# Patient Record
Sex: FEMALE | Race: WHITE | Employment: FULL TIME | URBAN - METROPOLITAN AREA
[De-identification: names, ages, dates, MRNs, and addresses within clinical notes are randomized per-mention and may not be internally consistent; named-entity substitution may affect disease eponyms.]

---

## 2017-06-04 ENCOUNTER — HOSPITAL ENCOUNTER (EMERGENCY)
Age: 42
Discharge: HOME | End: 2017-06-04
Attending: EMERGENCY MEDICINE
Payer: COMMERCIAL

## 2017-06-04 VITALS
OXYGEN SATURATION: 100 % | BODY MASS INDEX: 21.34 KG/M2 | HEART RATE: 105 BPM | WEIGHT: 125 LBS | RESPIRATION RATE: 20 BRPM | SYSTOLIC BLOOD PRESSURE: 114 MMHG | HEIGHT: 64 IN | DIASTOLIC BLOOD PRESSURE: 65 MMHG | TEMPERATURE: 98 F

## 2017-06-04 DIAGNOSIS — R53.81 MALAISE AND FATIGUE: Primary | ICD-10-CM

## 2017-06-04 DIAGNOSIS — R53.83 MALAISE AND FATIGUE: Primary | ICD-10-CM

## 2017-06-04 DIAGNOSIS — N39.0 UTI (URINARY TRACT INFECTION), UNCOMPLICATED: ICD-10-CM

## 2017-06-04 LAB
ALBUMIN SERPL BCP-MCNC: 4 G/DL (ref 3.4–5)
ALBUMIN/GLOB SERPL: 0.9 {RATIO} (ref 0.8–1.7)
ALP SERPL-CCNC: 38 U/L (ref 45–117)
ALT SERPL-CCNC: 19 U/L (ref 13–56)
ANION GAP BLD CALC-SCNC: 7 MMOL/L (ref 3–18)
APPEARANCE UR: ABNORMAL
AST SERPL W P-5'-P-CCNC: 17 U/L (ref 15–37)
BACTERIA URNS QL MICRO: ABNORMAL /HPF
BASOPHILS # BLD AUTO: 0 K/UL (ref 0–0.06)
BASOPHILS # BLD: 0 % (ref 0–2)
BILIRUB SERPL-MCNC: 0.5 MG/DL (ref 0.2–1)
BILIRUB UR QL: NEGATIVE
BUN SERPL-MCNC: 10 MG/DL (ref 7–18)
BUN/CREAT SERPL: 14 (ref 12–20)
CALCIUM SERPL-MCNC: 9.5 MG/DL (ref 8.5–10.1)
CHLORIDE SERPL-SCNC: 102 MMOL/L (ref 100–108)
CO2 SERPL-SCNC: 31 MMOL/L (ref 21–32)
COLOR UR: YELLOW
CREAT SERPL-MCNC: 0.72 MG/DL (ref 0.6–1.3)
DIFFERENTIAL METHOD BLD: NORMAL
EOSINOPHIL # BLD: 0 K/UL (ref 0–0.4)
EOSINOPHIL NFR BLD: 1 % (ref 0–5)
EPITH CASTS URNS QL MICRO: ABNORMAL /LPF (ref 0–5)
ERYTHROCYTE [DISTWIDTH] IN BLOOD BY AUTOMATED COUNT: 12.5 % (ref 11.6–14.5)
GLOBULIN SER CALC-MCNC: 4.5 G/DL (ref 2–4)
GLUCOSE SERPL-MCNC: 135 MG/DL (ref 74–99)
GLUCOSE UR STRIP.AUTO-MCNC: NEGATIVE MG/DL
HCG UR QL: NEGATIVE
HCT VFR BLD AUTO: 40.9 % (ref 35–45)
HGB BLD-MCNC: 14.2 G/DL (ref 12–16)
HGB UR QL STRIP: ABNORMAL
KETONES UR QL STRIP.AUTO: NEGATIVE MG/DL
LEUKOCYTE ESTERASE UR QL STRIP.AUTO: ABNORMAL
LYMPHOCYTES # BLD AUTO: 23 % (ref 21–52)
LYMPHOCYTES # BLD: 1.5 K/UL (ref 0.9–3.6)
MCH RBC QN AUTO: 30.6 PG (ref 24–34)
MCHC RBC AUTO-ENTMCNC: 34.7 G/DL (ref 31–37)
MCV RBC AUTO: 88.1 FL (ref 74–97)
MONOCYTES # BLD: 0.2 K/UL (ref 0.05–1.2)
MONOCYTES NFR BLD AUTO: 4 % (ref 3–10)
NEUTS SEG # BLD: 4.6 K/UL (ref 1.8–8)
NEUTS SEG NFR BLD AUTO: 72 % (ref 40–73)
NITRITE UR QL STRIP.AUTO: NEGATIVE
PH UR STRIP: 7 [PH] (ref 5–8)
PLATELET # BLD AUTO: 308 K/UL (ref 135–420)
PMV BLD AUTO: 9.9 FL (ref 9.2–11.8)
POTASSIUM SERPL-SCNC: 4 MMOL/L (ref 3.5–5.5)
PROT SERPL-MCNC: 8.5 G/DL (ref 6.4–8.2)
PROT UR STRIP-MCNC: NEGATIVE MG/DL
RBC # BLD AUTO: 4.64 M/UL (ref 4.2–5.3)
RBC #/AREA URNS HPF: NEGATIVE /HPF (ref 0–5)
SODIUM SERPL-SCNC: 140 MMOL/L (ref 136–145)
SP GR UR REFRACTOMETRY: 1.01 (ref 1–1.03)
UROBILINOGEN UR QL STRIP.AUTO: 0.2 EU/DL (ref 0.2–1)
WBC # BLD AUTO: 6.4 K/UL (ref 4.6–13.2)
WBC URNS QL MICRO: ABNORMAL /HPF (ref 0–5)

## 2017-06-04 PROCEDURE — 80053 COMPREHEN METABOLIC PANEL: CPT | Performed by: PHYSICIAN ASSISTANT

## 2017-06-04 PROCEDURE — 81001 URINALYSIS AUTO W/SCOPE: CPT | Performed by: PHYSICIAN ASSISTANT

## 2017-06-04 PROCEDURE — 81025 URINE PREGNANCY TEST: CPT

## 2017-06-04 PROCEDURE — 85025 COMPLETE CBC W/AUTO DIFF WBC: CPT | Performed by: PHYSICIAN ASSISTANT

## 2017-06-04 PROCEDURE — 99282 EMERGENCY DEPT VISIT SF MDM: CPT

## 2017-06-04 RX ORDER — CEPHALEXIN 500 MG/1
500 CAPSULE ORAL 2 TIMES DAILY
Qty: 14 CAP | Refills: 0 | Status: SHIPPED | OUTPATIENT
Start: 2017-06-04 | End: 2017-06-11

## 2017-06-04 RX ORDER — NORETHINDRONE ACETATE AND ETHINYL ESTRADIOL 1MG-20(21)
KIT ORAL
COMMUNITY

## 2017-06-04 RX ORDER — ONDANSETRON 4 MG/1
4 TABLET, ORALLY DISINTEGRATING ORAL
Qty: 20 TAB | Refills: 0 | Status: SHIPPED | OUTPATIENT
Start: 2017-06-04

## 2017-06-04 RX ORDER — BUSPIRONE HYDROCHLORIDE 5 MG/1
TABLET ORAL
COMMUNITY

## 2017-06-04 RX ORDER — HYOSCYAMINE SULFATE 0.38 MG/1
375 TABLET, EXTENDED RELEASE ORAL
COMMUNITY

## 2017-06-04 NOTE — DISCHARGE INSTRUCTIONS
Fatigue: Care Instructions  Your Care Instructions  Fatigue is a feeling of tiredness, exhaustion, or lack of energy. You may feel fatigue because of too much or not enough activity. It can also come from stress, lack of sleep, boredom, and poor diet. Many medical problems, such as viral infections, can cause fatigue. Emotional problems, especially depression, are often the cause of fatigue. Fatigue is most often a symptom of another problem. Treatment for fatigue depends on the cause. For example, if you have fatigue because you have a certain health problem, treating this problem also treats your fatigue. If depression or anxiety is the cause, treatment may help. Follow-up care is a key part of your treatment and safety. Be sure to make and go to all appointments, and call your doctor if you are having problems. It's also a good idea to know your test results and keep a list of the medicines you take. How can you care for yourself at home? · Get regular exercise. But don't overdo it. Go back and forth between rest and exercise. · Get plenty of rest.  · Eat a healthy diet. Do not skip meals, especially breakfast.  · Reduce your use of caffeine, tobacco, and alcohol. Caffeine is most often found in coffee, tea, cola drinks, and chocolate. · Limit medicines that can cause fatigue. This includes tranquilizers and cold and allergy medicines. When should you call for help? Watch closely for changes in your health, and be sure to contact your doctor if:  · You have new symptoms such as fever or a rash. · Your fatigue gets worse. · You have been feeling down, depressed, or hopeless. Or you may have lost interest in things that you usually enjoy. · You are not getting better as expected. Where can you learn more? Go to http://anabell-polly.info/. Enter G795 in the search box to learn more about \"Fatigue: Care Instructions. \"  Current as of: May 27, 2016  Content Version: 11.2  © 3423-8338 Dr. [employer]iff. Care instructions adapted under license by [employer] (which disclaims liability or warranty for this information). If you have questions about a medical condition or this instruction, always ask your healthcare professional. [place] [address] any warranty or liability for your use of this information. Urinary Tract Infection in Women: Care Instructions  Your Care Instructions    A urinary tract infection, or UTI, is a general term for an infection anywhere between the kidneys and the urethra (where urine comes out). Most UTIs are bladder infections. They often cause pain or burning when you urinate. UTIs are caused by bacteria and can be cured with antibiotics. Be sure to complete your treatment so that the infection goes away. Follow-up care is a key part of your treatment and safety. Be sure to make and go to all appointments, and call your doctor if you are having problems. It's also a good idea to know your test results and keep a list of the medicines you take. How can you care for yourself at home? · Take your antibiotics as directed. Do not stop taking them just because you feel better. You need to take the full course of antibiotics. · Drink extra water and other fluids for the next day or two. This may help wash out the bacteria that are causing the infection. (If you have kidney, heart, or liver disease and have to limit fluids, talk with your doctor before you increase your fluid intake.)  · Avoid drinks that are carbonated or have caffeine. They can irritate the bladder. · Urinate often. Try to empty your bladder each time. · To relieve pain, take a hot bath or lay a heating pad set on low over your lower belly or genital area. Never go to sleep with a heating pad in place. To prevent UTIs  · Drink plenty of water each day. This helps you urinate often, which clears bacteria from your system.  (If you have kidney, heart, or liver disease and have to limit fluids, talk with your doctor before you increase your fluid intake.)  · Urinate when you need to. · Urinate right after you have sex. · Change sanitary pads often. · Avoid douches, bubble baths, feminine hygiene sprays, and other feminine hygiene products that have deodorants. · After going to the bathroom, wipe from front to back. When should you call for help? Call your doctor now or seek immediate medical care if:  · Symptoms such as fever, chills, nausea, or vomiting get worse or appear for the first time. · You have new pain in your back just below your rib cage. This is called flank pain. · There is new blood or pus in your urine. · You have any problems with your antibiotic medicine. Watch closely for changes in your health, and be sure to contact your doctor if:  · You are not getting better after taking an antibiotic for 2 days. · Your symptoms go away but then come back. Where can you learn more? Go to http://anabell-polly.info/. Enter P883 in the search box to learn more about \"Urinary Tract Infection in Women: Care Instructions. \"  Current as of: November 28, 2016  Content Version: 11.2  © 3995-0631 [employer]. Care instructions adapted under license by [employer] (which disclaims liability or warranty for this information). If you have questions about a medical condition or this instruction, always ask your healthcare professional. [place] [address] any warranty or liability for your use of this information.

## 2017-06-04 NOTE — ED PROVIDER NOTES
[address]  EMERGENCY DEPARTMENT HISTORY AND PHYSICAL EXAM       Date: 2017   Patient Name: Steve Lugo   YOB: 1975  Medical Record Number: [id number]    History of Presenting Illness     Chief Complaint   Patient presents with    Other        History Provided By:  patient    Additional History: 1:21 PM   Steve Lugo is a 46 y.o. female with anxiety who presents to the emergency department C/O tingling in all extremities for the last 2 months and gradually worsening the last 3 days. Pt mentions 2 months ago the tingling started in her right foot, she was given a cortisone shot then. Then she started having tingling in her bilateral 1st and 3rd fingers one month ago. Sx's got worse over the last 3 days and pt [facility] but nothing was found then. Pt reports in ED both her arms feel heavy, upper back tenderness, generalized fatigue and muscle spasms all over. Pt is on a anti-anxiety medication which she started a few weeks ago. Pt is on Buspirone. Pt reports similar sx's 2 years ago which she was seen by Katherine Echevarria MD for and had an EMG and brain MRI; both test were normal. Pt is scheduled to see Katherine Echevarria MD in 2 weeks. PSHx include right thoracotomy. PMHx includes IBS. Pt denies cough, congestion or sore throat.     Primary Care Provider: Dalton Grubbs MD   Specialist:    Past History     Past Medical History:   Past Medical History:   Diagnosis Date    Adverse effect of anesthesia     low blood pressure    Anxiety     Cancer ([address]     skin    GERD (gastroesophageal reflux disease)     IBS (irritable bowel syndrome)     IBS (irritable bowel syndrome)     Psychiatric disorder     PVC (premature ventricular contraction)         Past Surgical History:   Past Surgical History:   Procedure Laterality Date    HX  SECTION      HX CYST REMOVAL      muscle of esophagus    HX GI      \"on my esophagus, I had a tumor, it was not cancer\"    HX GYN      HX ORTHOPAEDIC      \"right foot surgery\"  great toe    HX THORACOTOMY Right         Family History:   History reviewed. No pertinent family history. Social History:   Social History   Substance Use Topics    Smoking status: Never Smoker    Smokeless tobacco: Never Used    Alcohol use Yes      Comment: 2 x month        Allergies: Allergies   Allergen Reactions    Flagyl [Metronidazole] Hives    Morphine Itching        Review of Systems   Review of Systems   Constitutional: Positive for fatigue. HENT: Negative for congestion and sore throat. Respiratory: Negative for cough. Musculoskeletal: Positive for back pain (tenderness). Neurological:        (+) tingling   All other systems reviewed and are negative. Physical Exam  Vitals:    06/04/17 1319   BP: 114/65   Pulse: (!) 105   Resp: 20   Temp: 98 °F (36.7 °C)   SpO2: 100%   Weight: 56.7 kg (125 lb)   Height: 5' 4\" (1.626 m)       Physical Exam   Constitutional: She is oriented to person, place, and time. She appears well-developed and well-nourished. No distress. HENT:   Head: Normocephalic and atraumatic. Eyes: Conjunctivae and EOM are normal. Pupils are equal, round, and reactive to light. Neck: Normal range of motion. Neck supple. Cardiovascular: Normal rate and regular rhythm. Pulmonary/Chest: Effort normal and breath sounds normal.   Abdominal: Soft. Bowel sounds are normal. There is no tenderness. Musculoskeletal: Normal range of motion. She exhibits no edema, tenderness or deformity. Neurological: She is alert and oriented to person, place, and time. She has normal strength. No cranial nerve deficit. Coordination and gait normal. GCS eye subscore is 4. GCS verbal subscore is 5. GCS motor subscore is 6. Skin: Skin is warm and dry. No rash noted. No erythema. Psychiatric: She has a normal mood and affect. Her behavior is normal.   Nursing note and vitals reviewed.         Diagnostic Study Results     Labs -      Recent Results (from the past 12 hour(s))   CBC WITH AUTOMATED DIFF    Collection Time: 06/04/17  1:50 PM   Result Value Ref Range    WBC 6.4 4.6 - 13.2 K/uL    RBC 4.64 4.20 - 5.30 M/uL    HGB 14.2 12.0 - 16.0 g/dL    HCT 40.9 35.0 - 45.0 %    MCV 88.1 74.0 - 97.0 FL    MCH 30.6 24.0 - 34.0 PG    MCHC 34.7 31.0 - 37.0 g/dL    RDW 12.5 11.6 - 14.5 %    PLATELET 233 983 - 664 K/uL    MPV 9.9 9.2 - 11.8 FL    NEUTROPHILS 72 40 - 73 %    LYMPHOCYTES 23 21 - 52 %    MONOCYTES 4 3 - 10 %    EOSINOPHILS 1 0 - 5 %    BASOPHILS 0 0 - 2 %    ABS. NEUTROPHILS 4.6 1.8 - 8.0 K/UL    ABS. LYMPHOCYTES 1.5 0.9 - 3.6 K/UL    ABS. MONOCYTES 0.2 0.05 - 1.2 K/UL    ABS. EOSINOPHILS 0.0 0.0 - 0.4 K/UL    ABS. BASOPHILS 0.0 0.0 - 0.06 K/UL    DF AUTOMATED     METABOLIC PANEL, COMPREHENSIVE    Collection Time: 06/04/17  1:50 PM   Result Value Ref Range    Sodium 140 136 - 145 mmol/L    Potassium 4.0 3.5 - 5.5 mmol/L    Chloride 102 100 - 108 mmol/L    CO2 31 21 - 32 mmol/L    Anion gap 7 3.0 - 18 mmol/L    Glucose 135 (H) 74 - 99 mg/dL    BUN 10 7.0 - 18 MG/DL    Creatinine 0.72 0.6 - 1.3 MG/DL    BUN/Creatinine ratio 14 12 - 20      GFR est AA >60 >60 ml/min/1.73m2    GFR est non-AA >60 >60 ml/min/1.73m2    Calcium 9.5 8.5 - 10.1 MG/DL    Bilirubin, total 0.5 0.2 - 1.0 MG/DL    ALT (SGPT) 19 13 - 56 U/L    AST (SGOT) 17 15 - 37 U/L    Alk.  phosphatase 38 (L) 45 - 117 U/L    Protein, total 8.5 (H) 6.4 - 8.2 g/dL    Albumin 4.0 3.4 - 5.0 g/dL    Globulin 4.5 (H) 2.0 - 4.0 g/dL    A-G Ratio 0.9 0.8 - 1.7     URINALYSIS W/ RFLX MICROSCOPIC    Collection Time: 06/04/17  1:50 PM   Result Value Ref Range    Color YELLOW      Appearance CLOUDY      Specific gravity 1.009 1.005 - 1.030      pH (UA) 7.0 5.0 - 8.0      Protein NEGATIVE  NEG mg/dL    Glucose NEGATIVE  NEG mg/dL    Ketone NEGATIVE  NEG mg/dL    Bilirubin NEGATIVE  NEG      Blood TRACE (A) NEG      Urobilinogen 0.2 0.2 - 1.0 EU/dL    Nitrites NEGATIVE  NEG Leukocyte Esterase LARGE (A) NEG     URINE MICROSCOPIC ONLY    Collection Time: 06/04/17  1:50 PM   Result Value Ref Range    WBC 20 to 30 0 - 5 /hpf    RBC NEGATIVE  0 - 5 /hpf    Epithelial cells 3+ 0 - 5 /lpf    Bacteria 1+ (A) NEG /hpf   HCG URINE, QL. - POC    Collection Time: 06/04/17  2:20 PM   Result Value Ref Range    Pregnancy test,urine (POC) NEGATIVE  NEG         Radiologic Studies -  The following have been ordered and reviewed:  No orders to display           Medical Decision Making   I am the first provider for this patient. I reviewed the vital signs, available nursing notes, past medical history, past surgical history, family history and social history. Vital Signs-Reviewed the patient's vital signs. Patient Vitals for the past 12 hrs:   Temp Pulse Resp BP SpO2   06/04/17 1319 98 °F (36.7 °C) (!) 105 20 114/65 100 %     Procedures:   Procedures    ED Course:  1:21 PM  Initial assessment performed. The patients presenting problems have been discussed, and they are in agreement with the care plan formulated and outlined with them. I have encouraged them to ask questions as they arise throughout their visit. Medications Given in the ED:  Medications - No data to display    Discharge Note:  3:19 PM  Pt has been reexamined. Patient has no new complaints, changes, or physical findings. Care plan outlined and precautions discussed. Results were reviewed with the patient. All medications were reviewed with the patient; will d/c home with Keflex and Zofran. All of pt's questions and concerns were addressed. Patient was instructed and agrees to follow up with Neuro and PCP, as well as to return to the ED upon further deterioration. Patient is ready to go home. Diagnosis   Clinical Impression:   1.  Malaise and fatigue    2. UTI (urinary tract infection), uncomplicated         Discussion:    Follow-up Information     Follow up With Details Comments Contact Info    Carson Carcamo MD Schedule an appointment as soon as possible for a visit in 2 days  [address] [facility] [address]      Karlene Dixon,  Schedule an appointment as soon as possible for a visit in 2 days  [address]      [facility] EMERGENCY DEPT  As needed, If symptoms worsen [address] Carla Lennox  [phone]          Current Discharge Medication List      START taking these medications    Details   cephALEXin (KEFLEX) 500 mg capsule Take 1 Cap by mouth two (2) times a day for 7 days. Qty: 14 Cap, Refills: 0      ondansetron (ZOFRAN ODT) 4 mg disintegrating tablet Take 1 Tab by mouth every eight (8) hours as needed for Nausea. Qty: 20 Tab, Refills: 0             _______________________________   Attestations: This note is prepared by Marcela Estevez , acting as a Scribe for Neel Grajeda PA-C  on 1:18 PM on 6/4/2017 . Neel Grajeda PA-C: The scribe's documentation has been prepared under my direction and personally reviewed by me in its entirety.   _______________________________

## 2017-06-04 NOTE — ED TRIAGE NOTES
C/o tingling to RLE x2 months ago, tingling to fingers x1 month ago, generalized weakness, upper back pain. Pt was ambulatory to room. States has been seen by Dr. Jeferson Degroot 2 years ago for intermittent episodes of tingling to extremities, difficulty with tongue numbness at that time in 2015. Has an appt set up with Dr. Jeferson Degroot in a couple weeks, feels like symptoms are getting worse since Thursday. Seen Thursday at urgent care [facility] completed blood work there, instructed to f/u with neurologist.     Sepsis Screening completed    (  )Patient meets SIRS criteria. (x  )Patient does not meet SIRS criteria.       SIRS Criteria is achieved when two or more of the following are present   Temperature < 96.8°F (36°C) or > 100.9°F (38.3°C)   Heart Rate > 90 beats per minute   Respiratory Rate > 20 breaths per minute   WBC count > 12,000 or <4,000 or > 10% bands

## 2017-07-12 ENCOUNTER — HOSPITAL ENCOUNTER (OUTPATIENT)
Dept: MRI IMAGING | Age: 42
Discharge: HOME | End: 2017-07-12
Attending: PSYCHIATRY & NEUROLOGY
Payer: COMMERCIAL

## 2017-07-12 DIAGNOSIS — R94.131 ABNORMAL EMG: ICD-10-CM

## 2017-07-12 PROCEDURE — 72148 MRI LUMBAR SPINE W/O DYE: CPT

## 2017-07-12 PROCEDURE — 72195 MRI PELVIS W/O DYE: CPT

## 2017-07-12 PROCEDURE — 72141 MRI NECK SPINE W/O DYE: CPT

## 2018-12-21 ENCOUNTER — HOSPITAL ENCOUNTER (OUTPATIENT)
Dept: SLEEP MEDICINE | Age: 43
Discharge: HOME | End: 2018-12-21
Payer: COMMERCIAL

## 2018-12-21 DIAGNOSIS — E04.9 NONTOXIC NODULAR GOITER: ICD-10-CM

## 2018-12-21 DIAGNOSIS — G47.33 OSA (OBSTRUCTIVE SLEEP APNEA): ICD-10-CM

## 2018-12-21 DIAGNOSIS — Z98.891 HISTORY OF CESAREAN SECTION: ICD-10-CM

## 2018-12-21 DIAGNOSIS — K08.409: ICD-10-CM

## 2018-12-21 DIAGNOSIS — F32.A DEPRESSION: ICD-10-CM

## 2018-12-21 DIAGNOSIS — F41.9 ANXIETY: ICD-10-CM

## 2018-12-21 DIAGNOSIS — M70.60 TROCHANTERIC BURSITIS: ICD-10-CM

## 2018-12-21 DIAGNOSIS — I49.3 PVC (PREMATURE VENTRICULAR CONTRACTION): ICD-10-CM

## 2018-12-21 DIAGNOSIS — K58.9 IBS (IRRITABLE BOWEL SYNDROME): ICD-10-CM

## 2018-12-21 PROCEDURE — 95810 POLYSOM 6/> YRS 4/> PARAM: CPT

## 2018-12-22 VITALS
HEART RATE: 69 BPM | WEIGHT: 120 LBS | DIASTOLIC BLOOD PRESSURE: 57 MMHG | SYSTOLIC BLOOD PRESSURE: 97 MMHG | HEIGHT: 63 IN | BODY MASS INDEX: 21.26 KG/M2

## 2020-02-07 ENCOUNTER — HOSPITAL ENCOUNTER (OUTPATIENT)
Dept: PHYSICAL THERAPY | Age: 45
Discharge: HOME | End: 2020-02-07
Payer: COMMERCIAL

## 2020-02-07 PROCEDURE — 97161 PT EVAL LOW COMPLEX 20 MIN: CPT

## 2020-02-07 PROCEDURE — 97110 THERAPEUTIC EXERCISES: CPT

## 2020-02-07 NOTE — PROGRESS NOTES
PT DAILY TREATMENT NOTE/HIP [id number]    Patient Name: Ruth Ann Rudd  Date:2020  : 1975  [x]  Patient  Verified  Payor: BLUE CROSS / Plan: [address] [place] [address] / Product Type: PPO /    In time:805  Out time:900  Total Treatment Time (min): 25  Visit #: 1 of 16    Medicare/BCBS Only   Total Timed Codes (min):  25 1:1 Treatment Time:  25     Treatment Area: Right hip pain [M25.551]    SUBJECTIVE  Pain Level (0-10 scale): 3  []constant []intermittent []improving []worsening [x]no change since onset    Any medication changes, allergies to medications, adverse drug reactions, diagnosis change, or new procedure performed?: [x] No    [] Yes (see summary sheet for update)  Subjective functional status/changes:     Patient has c/c of right hip pain since 2019 with no apparent reason for onset. Patient describes pain as tightness when stretching which intermittently becomes sharp. Denies numbness/tingling. Denies popping/clicking. Aggravating factors: trunk flexion with knee extension. Alleviating factors: heat/cold application. Denies red flags: SOB, chest pain, dizziness/lightheadedness, blurred/double vision, HA, chills/fevers, night sweats, change in bowel/bladder control, abdominal pain, difficulty swallowing, slurred speech, unexplained weight gain/loss, nausea, vomiting. PMHx: bursitis of hips. Surgical Hx: fused right great toe 2015, thoracic surgery. 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 PLOF: active exerciser at gym 2-3x/wk, long walks daily. CLOF: limited ability to tolerate stretching exercises and yoga classed .   Diagnostic Imaging: none      OBJECTIVE/EXAMINATION    Precautions: none    25 min [x]Eval                  []Re-Eval       25 min Therapeutic Exercise:  [x] See flow sheet :   Rationale: increase ROM, increase strength and decrease pain to improve the patients ability to complete ADLs       With   [] TE   [] TA   [] neuro   [] other: Patient Education: [x] Review HEP    [] Progressed/Changed HEP based on:   [] positioning   [] body mechanics   [] transfers   [] heat/ice application    [] other:        Physical Therapy Evaluation- Hip    Posture:    Gait:  [] Normal    [] Abnormal    [] Antalgic    [] NWB    Device:    Describe:         ROM/Strength     AROM               Strength (1-5)  Hip Left Right Left Right   Flexion 142 123 4 4   Extension 18 11 4 4-   Abduction 42 37 4 4-   Adduction 38 43 4 4   ER 47 45 4 4-   IR 43 35 4 4                                     Palpation  [] Min  [x] Mod  [] Severe    Location:right ischial tuberosity  [] Min  [x] Mod  [] Severe    Location: right piriformis    Optional Tests  Den/ Loree Test: [x] Neg    [] Pos  Lauri Test:  [] Neg    [] Pos  Scouring Test: [x] Neg    [] Pos  Trendelenberg:  [x] Neg    [] Pos [] Left    [] Right  OberTest:   [] Neg    [x] Pos  Ely's Test:  [x] Neg    [] Pos  Piriformis Test:  [] Neg    [x] Pos    Other tests/ comments:       Pain Level (0-10 scale) post treatment: 3    ASSESSMENT/Changes in Function: Patient presents with signs/symptoms of right proximal hamstring and piriformis muscles acute strains. Patient exhibits decreased AROM, strength right hip with limited function and moderate pain. .    Patient will continue to benefit from skilled PT services to modify and progress therapeutic interventions, address functional mobility deficits, address ROM deficits, address strength deficits, analyze and address soft tissue restrictions, analyze and cue movement patterns, analyze and modify body mechanics/ergonomics and assess and modify postural abnormalities to attain remaining goals.      [x]  See Plan of Care  []  See progress note/recertification  []  See Discharge Summary         Progress towards goals / Updated goals:  See POC    PLAN  []  Upgrade activities as tolerated     [x]  Continue plan of care  []  Update interventions per flow sheet []  Discharge due to:_  []  Other:_      Gwendolyn Garza, PT 2/7/2020  8:07 AM

## 2020-02-07 NOTE — PROGRESS NOTES
[facility] at [address] [place] [address] Drive: [phone]   Fax: [phone]  PLAN OF CARE / [address] [place] [address] FOR PHYSICAL THERAPY SERVICES  Patient Name: Jessica Nuñez : 1975   Medical   Diagnosis: Right hip pain [M25.551] Treatment Diagnosis: Right hip pain   Onset Date: 2019     Referral Source: Referred, Self, MD Start of Care [facility]): 2020   Prior Hospitalization: See medical history Provider #: [id number]   Prior Level of Function: active exerciser at gym 2-3x/wk, long walks daily. Comorbidities: Bursitis of hips   Medications: Verified on Patient Summary List   The Plan of Care and following information is based on the information from the initial evaluation.   ===========================================================================================  Assessment / ojeda information:  Jessica Nuñez is a 45 y.o.  female who presents with  signs/symptoms of right proximal hamstring and piriformis muscles acute strains. Patient exhibits decreased AROM, strength right hip with limited function and moderate pain. .     Patient will continue to benefit from skilled PT services to modify and progress therapeutic interventions, address functional mobility deficits, address ROM deficits, address strength deficits, analyze and address soft tissue restrictions, analyze and cue movement patterns, analyze and modify body mechanics/ergonomics and assess and modify postural abnormalities to attain remaining goals.       Pt instructed in HEP and will f/u in clinic for PT.  ===========================================================================================  Eval Complexity: History MEDIUM  Complexity : 1-2 comorbidities / personal factors will impact the outcome/ POC ;  Examination  LOW Complexity : 1-2 Standardized tests and measures addressing body structure, function, activity limitation and / or participation in recreation ; Presentation LOW Complexity : Stable, uncomplicated ;  Decision Making MEDIUM Complexity : FOTO score of 26-74; : FOTO score = an established functional score where 100 = no disability  Overall Complexity LOW   Problem List: pain affecting function, decrease ROM, decrease strength, decrease ADL/ functional abilitiies, decrease activity tolerance and decrease flexibility/ joint mobility   Treatment Plan may include any combination of the following: Therapeutic exercise, Therapeutic activities, Neuromuscular re-education, Physical agent/modality, Manual therapy, Patient education, Self Care training and Functional mobility training  Patient / Family readiness to learn indicated by: asking questions, trying to perform skills and interest  Persons(s) to be included in education: patient (P)  Barriers to Learning/Limitations: no  Measures Taken: NA  Patient Goal (s): \"Relieve pain and restore flexibility. \"   Patient self reported health status: good  Rehabilitation Potential: good  Goals:  Short Term Goals: To be accomplished in 4 weeks:   Patient will report compliance with HEP 1x/day to aid in rehabilitation program.   Status at IE: Patient instructed in and provided written copy of initial Home Exercise Program.   Current: Same as IE      Patient will increase hip ROM to 100% in all planes to aid in completion of ADLs   Status at IE:  Hip Left Right   Flexion 142 123   Extension 18 11   Abduction 42 37   Adduction 38 43   ER 47 45   IR 43 35    Current: Same as IE      Long Term Goals: To be accomplished in 8 weeks:   Patient will increase B LE strength to 5/5 MMT throughout to aid in completion of ADLs.    Status at IE:  Hip Left Right   Flexion 4 4   Extension 4 4-   Abduction 4 4-   Adduction 4 4   ER 4 4-   IR 4 4    Current: Same as IE     Patient will report pain no greater than 1-2/10 throughout entire day to aid in completion of ADLs   Status at IE: 3-8/10   Current: Same as IE     Patent will be able to participate in full yoga class session without increased pain. Status at IE:Unable to tolerate hip stretches in yoga class. Current: Same as IE     Patient will improve FOTO (an established functional score where 100 = no disability) score to 73 points to demonstrate improvement in functional status. Status at IE: 62   Current: Same as IE      Frequency / Duration:   Patient to be seen 2  times per week for 8  weeks:  Patient / Caregiver education and instruction: self care and exercises      . Therapist Signature: Ayanna Schuler DPT Date: 8/7/6760   Certification Period: NA Time: 9:03 AM   ===========================================================================================  I certify that the above Physical Therapy Services are being furnished while the patient is under my care. I agree with the treatment plan and certify that this therapy is necessary. Physician Signature:        Date:       Time:     Please sign and return to [facility] [place] or you may fax the signed copy to [phone]. Thank you.

## 2020-02-13 ENCOUNTER — HOSPITAL ENCOUNTER (OUTPATIENT)
Dept: PHYSICAL THERAPY | Age: 45
Discharge: HOME | End: 2020-02-13
Payer: COMMERCIAL

## 2020-02-13 PROCEDURE — 97110 THERAPEUTIC EXERCISES: CPT

## 2020-02-13 NOTE — PROGRESS NOTES
PT DAILY TREATMENT NOTE    Patient Name: Sharol Gowers  Date:2020  : 1975  [x]  Patient  Verified  Payor: BLUE CROSS / Plan: [address] [place] [address] / Product Type: PPO /    In time: 800  Out time: 854  Total Treatment Time (min): 54  Total Timed Codes (min): 49  1:1 Treatment Time (MC only): 38   Visit #: 2 of 16    Treatment Area: Right hip pain [M25.551]    SUBJECTIVE  Pain Level (0-10 scale): 4-5  Any medication changes, allergies to medications, adverse drug reactions, diagnosis change, or new procedure performed?: [x] No    [] Yes (see summary sheet for update)  Subjective functional status/changes:   [] No changes reported  \"It is a little more painful lately and interfering with my exercise routine. I stopped using the treadmil    OBJECTIVE    38 min Therapeutic Exercise:  [x] See flow sheet :   Rationale: increase ROM, increase strength and decrease pain to improve the patients ability to complete ADLs  ambulation safety and efficiency in order to improve patient's ability to safely ambulate at home for self care. With   [] TE   [] TA   [] neuro   [] other: Patient Education: [x] Review HEP    [] Progressed/Changed HEP based on:   [] positioning   [] body mechanics   [] transfers   [] heat/ice application    [] other:      Other Objective/Functional Measures: NA     Pain Level (0-10 scale) post treatment: 3    ASSESSMENT/Changes in Function: Patient reports right LE has more difficulty performing exs than left LE due to sensation of weakness on right. Added three way hip to HEP and provided written copy of new exs. Patient responds well to treatment session. No adverse effects were noted from today's treatment session.      Patient will continue to benefit from skilled PT services to modify and progress therapeutic interventions, address functional mobility deficits, address ROM deficits, address strength deficits, analyze and address soft tissue restrictions, analyze and cue movement patterns, analyze and modify body mechanics/ergonomics, assess and modify postural abnormalities,  and instruct in home and community integration to attain remaining goals. []  See Plan of Care  []  See progress note/recertification  []  See Discharge Summary         Progress towards goals / Updated goals:  Short Term Goals: To be accomplished in 4 weeks:                   Patient will report compliance with HEP 1x/day to aid in rehabilitation program.                   Status at IE: Patient instructed in and provided written copy of initial Home Exercise Program.                   Current: Reports performing HEP daily at home, but still requires verbal and visual cueing for correct technique. 2/13/2020                      Patient will increase hip ROM to 100% in all planes to aid in completion of ADLs                   Status at IE:  Hip Left Right   Flexion 142 123   Extension 18 11   Abduction 42 37   Adduction 38 43   ER 47 45   IR 43 35                    Current: Same as IE        Long Term Goals: To be accomplished in 8 weeks:                   Patient will increase B LE strength to 5/5 MMT throughout to aid in completion of ADLs. Status at IE:  Hip Left Right   Flexion 4 4   Extension 4 4-   Abduction 4 4-   Adduction 4 4   ER 4 4-   IR 4 4                    Current: Same as IE                      Patient will report pain no greater than 1-2/10 throughout entire day to aid in completion of ADLs                   Status at IE: 3-8/10                   Current: Same as IE                      Patent will be able to participate in full yoga class session without increased pain. Status at IE:Unable to tolerate hip stretches in yoga class. Current: Same as IE                      Patient will improve FOTO (an established functional score where 100 = no disability) score to 73 points to demonstrate improvement in functional status. Status at IE: 62                   Current: Same as IE         PLAN  []  Upgrade activities as tolerated     [x]  Continue plan of care  []  Update interventions per flow sheet       []  Discharge due to:_  []  Other:_      Toña Azevedo, PT, DPT 2/13/2020  8:05 AM    Future Appointments   Date Time Provider Garland Jj   2/14/2020  8:00 AM Matty Summers, PT [facility]   2/18/2020  7:30 AM Darin Gonzalez, PT [facility]   2/20/2020  7:30 AM Darin Gonzalez PT [facility]   2/25/2020  7:30 AM Darin Gonzalez PT [facility]   2/27/2020  7:30 AM Darin Gonzalez, PT [facility]   3/3/2020  7:30 AM Darin Gonzalez PT [facility]   3/5/2020  7:30 AM Darin Gonzalez PT [facility]

## 2020-02-14 ENCOUNTER — HOSPITAL ENCOUNTER (OUTPATIENT)
Dept: PHYSICAL THERAPY | Age: 45
Discharge: HOME | End: 2020-02-14
Payer: COMMERCIAL

## 2020-02-14 PROCEDURE — 97110 THERAPEUTIC EXERCISES: CPT

## 2020-02-14 NOTE — PROGRESS NOTES
PT DAILY TREATMENT NOTE    Patient Name: Rosita Bence  Date:2020  : 1975  [x]  Patient  Verified  Payor: [employer] [place] / Plan: [address] [place] [address] / Product Type: PPO /    In time: 380  Out time: 842  Total Treatment Time (min): 43  Total Timed Codes (min): 43  1:1 Treatment Time (MC only): 40   Visit #: 3 of 16    Treatment Area: Right hip pain [M25.551]    SUBJECTIVE  Pain Level (0-10 scale): 6  Any medication changes, allergies to medications, adverse drug reactions, diagnosis change, or new procedure performed?: [x] No    [] Yes (see summary sheet for update)  Subjective functional status/changes:   [] No changes reported  \"I woke up in the early morning with more pain in the hip. I am wondering if we did too much yesterday or if I just slept on it wrong. \"    OBJECTIVE    40 min Therapeutic Exercise:  [x] See flow sheet :   Rationale: increase ROM, increase strength and decrease pain to improve the patients ability to complete ADLs  ambulation safety and efficiency in order to improve patient's ability to safely ambulate at home for self care. With   [] TE   [] TA   [] neuro   [] other: Patient Education: [x] Review HEP    [] Progressed/Changed HEP based on:   [] positioning   [] body mechanics   [] transfers   [] heat/ice application    [] other:      Other Objective/Functional Measures: NA     Pain Level (0-10 scale) post treatment: 2    ASSESSMENT/Changes in Function: Patient reporting increased pain today. Reduced intensity of exercises to accommodate pain complaints. Provided written copy of modification to HEP. Patient responds well to treatment session. No adverse effects were noted from today's treatment session.      Patient will continue to benefit from skilled PT services to modify and progress therapeutic interventions, address functional mobility deficits, address ROM deficits, address strength deficits, analyze and address soft tissue restrictions, analyze and cue movement patterns, analyze and modify body mechanics/ergonomics, assess and modify postural abnormalities,  and instruct in home and community integration to attain remaining goals. []  See Plan of Care  []  See progress note/recertification  []  See Discharge Summary         Progress towards goals / Updated goals:  Short Term Goals: To be accomplished in 4 weeks:                   MKPLPYL will report compliance with HEP 1x/day to aid in rehabilitation program.                   Status at IE: Patient instructed in and provided written copy of initial Home Exercise Program.                   Current: Reports performing HEP daily at home, but still requires verbal and visual cueing for correct technique. 2/13/2020                      Patient will increase hip ROM to 100% in all planes to aid in completion of ADLs                   Status at IE:  Hip Left Right   Flexion 142 123   Extension 18 11   Abduction 42 37   Adduction 38 43   ER 47 45   IR 43 35                    Current: Same as IE        Long Term Goals: To be accomplished in 8 weeks:                   Patient will increase B LE strength to 5/5 MMT throughout to aid in completion of ADLs.                    Status at IE:  Hip Left Right   Flexion 4 4   Extension 4 4-   Abduction 4 4-   Adduction 4 4   ER 4 4-   IR 4 4                    Current: Same as IE                      PLXICJH will report pain no greater than 1-2/10 throughout entire day to aid in completion of ADLs                   Status at IE: 3-8/10                   Current: Same as IE                      Patent will be able to participate in full yoga class session without increased pain.                   Status at IE:Unable to tolerate hip stretches in yoga class.                   Current: Same as IE                      Patient will improve FOTO (an established functional score where 100 = no disability) score to 73 points to demonstrate improvement in functional status.                    Status at IE: 62                   Current: Same as IE         PLAN  []  Upgrade activities as tolerated     [x]  Continue plan of care  []  Update interventions per flow sheet       []  Discharge due to:_  []  Other:_      Ade Lucero PT, DPT 2/14/2020  8:02 AM    Future Appointments   Date Time Provider Garland Jj   2/18/2020  7:30 AM Arby Sear, PT [facility]   2/20/2020  7:30 AM Arby Sear, PT [facility]   2/25/2020  7:30 AM Arby Sear, PT [facility]   2/27/2020  7:30 AM Arby Sear, PT [facility]   3/3/2020  7:30 AM Arby Sear, PT [facility]   3/5/2020  7:30 AM Arby Sear, PT [facility]

## 2020-02-18 ENCOUNTER — HOSPITAL ENCOUNTER (OUTPATIENT)
Dept: PHYSICAL THERAPY | Age: 45
Discharge: HOME | End: 2020-02-18
Payer: COMMERCIAL

## 2020-02-18 PROCEDURE — 97110 THERAPEUTIC EXERCISES: CPT

## 2020-02-18 NOTE — PROGRESS NOTES
PT DAILY TREATMENT NOTE    Patient Name: Charmayne Bailiff  Date:2020  : 1975  [x]  Patient  Verified  Payor: KARIN [place] / Plan: [address] [place] [address] / Product Type: PPO /    In time: 730  Out time: 823  Total Treatment Time (min): 53  Total Timed Codes (min): 40  1:1 Treatment Time ( only): 40   Visit #: 4 of 16    Treatment Area: Right hip pain [M25.551]    SUBJECTIVE  Pain Level (0-10 scale): 6  Any medication changes, allergies to medications, adverse drug reactions, diagnosis change, or new procedure performed?: [x] No    [] Yes (see summary sheet for update)  Subjective functional status/changes:   [] No changes reported  \"I am now doing stretches before going to bed and it is helping me sleep better. But, overall, it is still bothering me quite a bit. The pain is a bit more sharp recently. \"    OBJECTIVE    Modalities Rationale:     decrease edema and decrease pain to improve patient's ability to Complete ADLS   min []  Vasopneumatic Device, press/temp:    10 min [x]  Other: Cold pack supine after exercises posterior right hip   [x] Skin assessment post-treatment (if applicable):    [x]  intact    []  redness- no adverse reaction     []redness  adverse reaction:        40 min Therapeutic Exercise:  [x] See flow sheet :   Rationale: increase ROM, increase strength and decrease pain to improve the patients ability to complete ADLs  ambulation safety and efficiency in order to improve patient's ability to safely ambulate at home for self care. With   [] TE   [] TA   [] neuro   [] other: Patient Education: [x] Review HEP    [] Progressed/Changed HEP based on:   [] positioning   [] body mechanics   [] transfers   [] heat/ice application    [] other:      Other Objective/Functional Measures: NA     Pain Level (0-10 scale) post treatment: 2    ASSESSMENT/Changes in Function: Patient instructed to hold on squat exercise in HEP due to c/o sharp pain at home.  Patient responds well to treatment session. No adverse effects were noted from today's treatment session. Patient will continue to benefit from skilled PT services to modify and progress therapeutic interventions, address functional mobility deficits, address ROM deficits, address strength deficits, analyze and address soft tissue restrictions, analyze and cue movement patterns, analyze and modify body mechanics/ergonomics, assess and modify postural abnormalities,  and instruct in home and community integration to attain remaining goals. []  See Plan of Care  []  See progress note/recertification  []  See Discharge Summary         Progress towards goals / Updated goals:  Short Term Goals: To be accomplished in 4 weeks:                   LSUXCCT will report compliance with HEP 1x/day to aid in rehabilitation program.                   Status at IE: Patient instructed in and provided written copy of initial Home Exercise Program.                   Current: Reports performing HEP daily at home, but still requires verbal and visual cueing for correct technique.  2/13/2020                      Patient will increase hip ROM to 100% in all planes to aid in completion of ADLs                   Status at IE:  Hip Left Right   Flexion 142 123   Extension 18 11   Abduction 42 37   Adduction 38 43   ER 47 45   IR 43 35                    Current: Same as IE        Long Term Goals: To be accomplished in 8 weeks:                   Patient will increase B LE strength to 5/5 MMT throughout to aid in completion of ADLs.                  Status at IE:  Hip Left Right   Flexion 4 4   Extension 4 4-   Abduction 4 4-   Adduction 4 4   ER 4 4-   IR 4 4                    Current: Same as IE                      NXNTDHQ will report pain no greater than 1-2/10 throughout entire day to aid in completion of ADLs                   Status at IE: 3-8/10                   Current: Pain staying in 5-6/10 range past few days.    2/18/2020                      Patent will be able to participate in full yoga class session without increased pain.                   Status at IE:Unable to tolerate hip stretches in yoga class.                   Current: Same as IE                      Patient will improve FOTO (an established functional score where 100 = no disability) score to 73 points to demonstrate improvement in functional status.                    Status at IE: 62                   Current: Same as IE         PLAN  []  Upgrade activities as tolerated     [x]  Continue plan of care  []  Update interventions per flow sheet       []  Discharge due to:_  []  Other:_      Wilton Anaya PT, DPT 2/18/2020  7:37 AM    Future Appointments   Date Time Provider Garland Jj   2/20/2020  7:30 AM [facility]   2/25/2020  7:30 AM Wendie Nuñez, PT [facility]   2/27/2020  7:30 AM [facility]   3/3/2020  7:30 AM Wendie Nuñez, PT [facility]   3/5/2020  7:30 AM Wendie Nuñez, PT [facility]

## 2020-02-20 ENCOUNTER — HOSPITAL ENCOUNTER (OUTPATIENT)
Dept: PHYSICAL THERAPY | Age: 45
Discharge: HOME | End: 2020-02-20
Payer: COMMERCIAL

## 2020-02-20 PROCEDURE — 97110 THERAPEUTIC EXERCISES: CPT

## 2020-02-20 NOTE — PROGRESS NOTES
PT DAILY TREATMENT NOTE    Patient Name: Sofiya Pierre  Date:2020  : 1975  [x]  Patient  Verified  Payor: BLUE CROSS / Plan: [address] [place] [address] / Product Type: PPO /    In time: 730  Out time: 603  Total Treatment Time (min): 58  Total Timed Codes (min): 43  1:1 Treatment Time (MC only): 38   Visit #: 5 of 16    Treatment Area: Right hip pain [M25.551]    SUBJECTIVE  Pain Level (0-10 scale): 6  Any medication changes, allergies to medications, adverse drug reactions, diagnosis change, or new procedure performed?: [x] No    [] Yes (see summary sheet for update)  Subjective functional status/changes:   [] No changes reported  \"It feels like it is slowly getting worse rather than better. I had to sit all day yesterday at work and the sitting really aggravates it. \"    OBJECTIVE    Modalities Rationale:     decrease edema and decrease pain to improve patient's ability to Complete ADLS   min []  Vasopneumatic Device, press/temp:    10 min [x]  Other: Right hip supine, cold pack   [x] Skin assessment post-treatment (if applicable):    [x]  intact    []  redness- no adverse reaction     []redness  adverse reaction:          38 min Therapeutic Exercise:  [x] See flow sheet :   Rationale: increase ROM, increase strength and decrease pain to improve the patients ability to complete ADLs  ambulation safety and efficiency in order to improve patient's ability to safely ambulate at home for self care. With   [] TE   [] TA   [] neuro   [] other: Patient Education: [x] Review HEP    [] Progressed/Changed HEP based on:   [] positioning   [] body mechanics   [] transfers   [] heat/ice application    [] other:      Other Objective/Functional Measures: NA     Pain Level (0-10 scale) post treatment: 0    ASSESSMENT/Changes in Function: Patient advised to alternate more frequent standing with sitting at work and is also considering trial of NSAIDs to reduce inflammation.  Patient responds well to treatment session. No adverse effects were noted from today's treatment session. Patient will continue to benefit from skilled PT services to modify and progress therapeutic interventions, address functional mobility deficits, address ROM deficits, address strength deficits, analyze and address soft tissue restrictions, analyze and cue movement patterns, analyze and modify body mechanics/ergonomics, assess and modify postural abnormalities,  and instruct in home and community integration to attain remaining goals. []  See Plan of Care  []  See progress note/recertification  []  See Discharge Summary         Progress towards goals / Updated goals:  Short Term Goals: To be accomplished in 4 weeks:                   PWUJFYK will report compliance with HEP 1x/day to aid in rehabilitation program.                   Status at IE: Patient instructed in and provided written copy of initial Home Exercise Program.                   Current: Reports performing HEP daily at home, but still requires verbal and visual cueing for correct technique.  2/13/2020                      Patient will increase hip ROM to 100% in all planes to aid in completion of ADLs                   Status at IE:  Hip Left Right   Flexion 142 123   Extension 18 11   Abduction 42 37   Adduction 38 43   ER 47 45   IR 43 35                    Current: right hip flexion improved to 128, extension remains 11 degrees. 2/20/2020        Long Term Goals: To be accomplished in 8 weeks:                   QFEABLB will increase B LE strength to 5/5 MMT throughout to aid in completion of ADLs.                    Status at IE:  Hip Left Right   Flexion 4 4   Extension 4 4-   Abduction 4 4-   Adduction 4 4   ER 4 4-   IR 4 4                    Current: Same as IE                      PMYVMSD will report pain no greater than 1-2/10 throughout entire day to aid in completion of ADLs                   Status at IE: 3-8/10                   Current: Pain staying in 5-6/10 range past few days. 2/18/2020                      Patent will be able to participate in full yoga class session without increased pain.                   Status at IE:Unable to tolerate hip stretches in yoga class.                   Current: Same as IE                      Patient will improve FOTO (an established functional score where 100 = no disability) score to 73 points to demonstrate improvement in functional status.                    Status at IE: 62                   Current: Same as IE         PLAN  []  Upgrade activities as tolerated     [x]  Continue plan of care  []  Update interventions per flow sheet       []  Discharge due to:_  []  Other:_      Willow Galdamezer, PT, DPT 2/20/2020  7:39 AM    Future Appointments   Date Time Provider Garland Jj   2/25/2020  7:30 AM AALIYAH Alvarado [facility]   2/27/2020  7:30 AM AALIYAH AlvaradoHPPELON THE [facility]   3/3/2020  7:30 AM AALIYAH AlvaradoHPPELON THE [facility]   3/5/2020  7:30 AM AALIYAH AlvaradoHPTJANICE THE [facility]

## 2020-02-25 ENCOUNTER — HOSPITAL ENCOUNTER (OUTPATIENT)
Dept: PHYSICAL THERAPY | Age: 45
Discharge: HOME | End: 2020-02-25
Payer: COMMERCIAL

## 2020-02-25 PROCEDURE — 97110 THERAPEUTIC EXERCISES: CPT

## 2020-02-25 NOTE — PROGRESS NOTES
PT DAILY TREATMENT NOTE    Patient Name: Gaetano Valencia  Date:2020  : 1975  [x]  Patient  Verified  Payor: BLUE CROSS / Plan: [address] [place] [address] Avenue / Product Type: PPO /    In time: 863  Out time: 835  Total Treatment Time (min): 65  Total Timed Codes (min): 50  1:1 Treatment Time ( only): 39   Visit #: 6 of 16    Treatment Area: Right hip pain [M25.551]    SUBJECTIVE  Pain Level (0-10 scale): 6  Any medication changes, allergies to medications, adverse drug reactions, diagnosis change, or new procedure performed?: [x] No    [] Yes (see summary sheet for update)  Subjective functional status/changes:   [] No changes reported  \"It was feeling real good for a couple days. But then I was real busy past couple days and the pain came back. Not getting sharp pain as often but still getting it when I bend over. \"    OBJECTIVE    Modalities Rationale:     decrease edema and decrease pain to improve patient's ability to Complete ADLS   min []  Vasopneumatic Device, press/temp:    10 min [x]  Other: Cold pack supine   [] Skin assessment post-treatment (if applicable):    []  intact    []  redness- no adverse reaction     []redness  adverse reaction:        45 min Therapeutic Exercise:  [x] See flow sheet :   Rationale: increase ROM, increase strength and decrease pain to improve the patients ability to complete ADLs    With   [] TE   [] TA   [] neuro   [] other: Patient Education: [x] Review HEP    [] Progressed/Changed HEP based on:   [] positioning   [] body mechanics   [] transfers   [] heat/ice application    [] other:      Other Objective/Functional Measures: NA     Pain Level (0-10 scale) post treatment: 2    ASSESSMENT/Changes in Function: Patient responds well to treatment session. Instructed patient to be more attentive to avoid activities at home that cause sharp pain as these are likely causing pain to persist. No adverse effects were noted from today's treatment session.      Patient will continue to benefit from skilled PT services to modify and progress therapeutic interventions, address functional mobility deficits, address ROM deficits, address strength deficits, analyze and address soft tissue restrictions, analyze and cue movement patterns, analyze and modify body mechanics/ergonomics, assess and modify postural abnormalities,  and instruct in home and community integration to attain remaining goals. []  See Plan of Care  []  See progress note/recertification  []  See Discharge Summary         Progress towards goals / Updated goals:  Short Term Goals: To be accomplished in 4 weeks:                   VPMARGEINN will report compliance with HEP 1x/day to aid in rehabilitation program.                   Status at IE: Patient instructed in and provided written copy of initial Home Exercise Program.                   Current: Reports performing HEP daily at home, but still requires verbal and visual cueing for correct technique.  2/13/2020                      Patient will increase hip ROM to 100% in all planes to aid in completion of ADLs                   Status at IE:  Hip Left Right   Flexion 142 123   Extension 18 11   Abduction 42 37   Adduction 38 43   ER 47 45   IR 43 35                    Current: right hip flexion improved to 128, extension remains 11 degrees. 2/20/2020        Long Term Goals: To be accomplished in 8 weeks:                   SVROUIU will increase B LE strength to 5/5 MMT throughout to aid in completion of ADLs.                    Status at IE:  Hip Left Right   Flexion 4 4   Extension 4 4-   Abduction 4 4-   Adduction 4 4   ER 4 4-   IR 4 4                    Current: Same as IE                      SOAISIH will report pain no greater than 1-2/10 throughout entire day to aid in completion of ADLs                   Status at IE: 3-8/10                   Current: Pain staying in 5-6/10 range past few days.   2/18/2020                      Patent will be able to participate in full yoga class session without increased pain.                   Status at IE:Unable to tolerate hip stretches in yoga class.                   Current: Same as IE                      Patient will improve FOTO (an established functional score where 100 = no disability) score to 73 points to demonstrate improvement in functional status.                    Status at IE: 62                   Current: Same as IE         PLAN  []  Upgrade activities as tolerated     [x]  Continue plan of care  []  Update interventions per flow sheet       []  Discharge due to:_  []  Other:_      Fco Wiggins PT, DPT 2/25/2020  7:41 AM    Future Appointments   Date Time Provider Garland Jj   2/27/2020  7:30 AM Leslee Trejo, PT SONYA [facility]   3/5/2020  7:30 AM Leslee Trejo, PT SONYA [facility]   3/6/2020  8:00 AM Leslee Trejo, PT SONYA [facility]

## 2020-02-27 ENCOUNTER — HOSPITAL ENCOUNTER (OUTPATIENT)
Dept: PHYSICAL THERAPY | Age: 45
Discharge: HOME | End: 2020-02-27
Payer: COMMERCIAL

## 2020-02-27 PROCEDURE — 97110 THERAPEUTIC EXERCISES: CPT

## 2020-02-27 NOTE — PROGRESS NOTES
PT DAILY TREATMENT NOTE    Patient Name: Randy Duty  Date:2020  : 1975  [x]  Patient  Verified  Payor: KARIN YOLANDA / Plan: [address] [place] [address] / Product Type: PPO /    In time: 384  Out time: 835  Total Treatment Time (min): 62  Total Timed Codes (min): 50  1:1 Treatment Time (MC only): 40   Visit #: 7 of 16    Treatment Area: Right hip pain [M25.551]    SUBJECTIVE  Pain Level (0-10 scale): 4  Any medication changes, allergies to medications, adverse drug reactions, diagnosis change, or new procedure performed?: [x] No    [] Yes (see summary sheet for update)  Subjective functional status/changes:   [] No changes reported  \"I have been working at avoiding the sharp pain and I am feeling it less often. But, I still feel twinges of pain when I use the right leg, even during walking sometimes. \"    OBJECTIVE    Modalities Rationale:     decrease edema and decrease pain to improve patient's ability to Complete ADLS   min []  Vasopneumatic Device, press/temp:    10 min [x]  Other: Cold pack right hip    [x] Skin assessment post-treatment (if applicable):    [x]  intact    []  redness- no adverse reaction     []redness  adverse reaction:          40 min Therapeutic Exercise:  [x] See flow sheet :   Rationale: increase ROM, increase strength and decrease pain to improve the patients ability to complete ADLs  ambulation safety and efficiency in order to improve patient's ability to safely ambulate at home for self care. With   [] TE   [] TA   [] neuro   [] other: Patient Education: [x] Review HEP    [] Progressed/Changed HEP based on:   [] positioning   [] body mechanics   [] transfers   [] heat/ice application    [] other:      Other Objective/Functional Measures: NA     Pain Level (0-10 scale) post treatment: 2    ASSESSMENT/Changes in Function: Patient now reporting more symptoms at piriformis muscle than at hamstring tendon indicating hamstring tendon showing good signs of healing.  Patient responds well to treatment session. No adverse effects were noted from today's treatment session. Patient will continue to benefit from skilled PT services to modify and progress therapeutic interventions, address functional mobility deficits, address ROM deficits, address strength deficits, analyze and address soft tissue restrictions, analyze and cue movement patterns, analyze and modify body mechanics/ergonomics, assess and modify postural abnormalities,  and instruct in home and community integration to attain remaining goals. []  See Plan of Care  []  See progress note/recertification  []  See Discharge Summary         Progress towards goals / Updated goals:  Short Term Goals: To be accomplished in 4 weeks:                   UQGMMUZ will report compliance with HEP 1x/day to aid in rehabilitation program.                   Status at IE: Patient instructed in and provided written copy of initial Home Exercise Program.                   Current: Reports performing HEP daily at home, but still requires verbal and visual cueing for correct technique.  2/13/2020                      Patient will increase hip ROM to 100% in all planes to aid in completion of ADLs                   Status at IE:  Hip Left Right   Flexion 142 123   Extension 18 11   Abduction 42 37   Adduction 38 43   ER 47 45   IR 43 35                    Current: right hip flexion improved to 128, extension remains 11 degrees.  2/20/2020        Long Term Goals: To be accomplished in 8 weeks:                   Patient will increase B LE strength to 5/5 MMT throughout to aid in completion of ADLs.                    Status at IE:  Hip Left Right   Flexion 4 4   Extension 4 4-   Abduction 4 4-   Adduction 4 4   ER 4 4-   IR 4 4                    Current: Same as IE                      YIOJQBP will report pain no greater than 1-2/10 throughout entire day to aid in completion of ADLs                   Status at IE: 3-8/10                   Current: Right hip pain reduced to 4/10 with reduction in episodes of sharp pain.   2/27/2020                      Patent will be able to participate in full yoga class session without increased pain.                   Status at IE:Unable to tolerate hip stretches in yoga class.                   Current: Same as IE                      Patient will improve FOTO (an established functional score where 100 = no disability) score to 73 points to demonstrate improvement in functional status.                    Status at IE: 62                   Current: Same as IE         PLAN  []  Upgrade activities as tolerated     [x]  Continue plan of care  []  Update interventions per flow sheet       []  Discharge due to:_  []  Other:_      Dayla Carlos, PT, DPT 2/27/2020  7:38 AM    Future Appointments   Date Time Provider Garland Jj   3/5/2020  7:30 AM Ken Palomo, PT MIHPPELON [facility]   3/6/2020  8:00 AM Osmar Summers, AALIYAH HASSAN [facility]

## 2020-03-03 ENCOUNTER — APPOINTMENT (OUTPATIENT)
Dept: PHYSICAL THERAPY | Age: 45
End: 2020-03-03
Payer: COMMERCIAL

## 2020-03-05 ENCOUNTER — HOSPITAL ENCOUNTER (OUTPATIENT)
Dept: PHYSICAL THERAPY | Age: 45
Discharge: HOME | End: 2020-03-05
Payer: COMMERCIAL

## 2020-03-05 PROCEDURE — 97110 THERAPEUTIC EXERCISES: CPT

## 2020-03-05 NOTE — PROGRESS NOTES
PT DAILY TREATMENT NOTE    Patient Name: Michael Stanton  Date:3/5/2020  : 1975  [x]  Patient  Verified  Payor: BLUE CROSS / Plan: [address] [place] [address] / Product Type: PPO /    In time: 730  Out time: 827  Total Treatment Time (min): 57  Total Timed Codes (min): 45  1:1 Treatment Time (MC only): 40   Visit #: 8 of 16    Treatment Area: Right hip pain [M25.551]    SUBJECTIVE  Pain Level (0-10 scale): 4  Any medication changes, allergies to medications, adverse drug reactions, diagnosis change, or new procedure performed?: [x] No    [] Yes (see summary sheet for update)  Subjective functional status/changes:   [] No changes reported  \"The location of the pain is shifting quite a bit. It is more deep in hip and tends to shoot down the leg. It is pretty similar to the sciatica I had in the past.\"    OBJECTIVE    Modalities Rationale:     decrease edema and decrease pain to improve patient's ability to Complete ADLS           min []? Vasopneumatic Device, press/temp:     10 min [x]? Other: Cold pack supine   [x]? Skin assessment post-treatment (if applicable):    [x]? intact    []? redness- no adverse reaction     []? redness  adverse reaction:        40 min Therapeutic Exercise:  [x] See flow sheet :   Rationale: increase ROM, increase strength and decrease pain to improve the patients ability to complete ADLs  ambulation safety and efficiency in order to improve patient's ability to safely ambulate at home for self care. With   [] TE   [] TA   [] neuro   [] other: Patient Education: [x] Review HEP    [] Progressed/Changed HEP based on:   [] positioning   [] body mechanics   [] transfers   [] heat/ice application    [] other:      Other Objective/Functional Measures: NA     Pain Level (0-10 scale) post treatment: 2    ASSESSMENT/Changes in Function: Patient right proximal hamstring tendon strain symptoms are now almost completely resolved.  However, patient is now reporting new symptoms which are indicative of right sciatica, which patient has had prior episodes. PT is modifying treatment program to address new symptoms. .    Patient will continue to benefit from skilled PT services to modify and progress therapeutic interventions, address functional mobility deficits, address ROM deficits, address strength deficits, analyze and address soft tissue restrictions, analyze and cue movement patterns, analyze and modify body mechanics/ergonomics, assess and modify postural abnormalities,  and instruct in home and community integration to attain remaining goals. []  See Plan of Care  []  See progress note/recertification  []  See Discharge Summary         Progress towards goals / Updated goals:  Short Term Goals: To be accomplished in 4 weeks:                   FLXTDJD will report compliance with HEP 1x/day to aid in rehabilitation program.                   Status at IE: Patient instructed in and provided written copy of initial Home Exercise Program.                   Current: Reports performing HEP daily at home, now with correct technique.   3/5/2020                      Patient will increase hip ROM to 100% in all planes to aid in completion of ADLs                   Status at IE:  Hip Left Right   Flexion 142 123   Extension 18 11   Abduction 42 37   Adduction 38 43   ER 47 45   IR 43 35                    Current: right hip flexion improved to 128, extension remains 11 degrees.  2/20/2020        Long Term Goals: To be accomplished in 8 weeks:                   Patient will increase B LE strength to 5/5 MMT throughout to aid in completion of ADLs.                  Status at IE:  Hip Left Right   Flexion 4 4   Extension 4 4-   Abduction 4 4-   Adduction 4 4   ER 4 4-   IR 4 4                    Current: Right hip strength improved to 4/5 throughout.      3/5/2020                      Patient will report pain no greater than 1-2/10 throughout entire day to aid in completion of ADLs                   Status at IE: 3-8/10                   Current: Right hip pain no longer sharp, but now showing symptoms of sciatica  3/5/2020                      Patent will be able to participate in full yoga class session without increased pain.                   Status at IE:Unable to tolerate hip stretches in yoga class.                   Current: Now participating in yoga class, with some modifications. 3/5/2020                      Patient will improve FOTO (an established functional score where 100 = no disability) score to 73 points to demonstrate improvement in functional status.                  Status at IE: 62                   Current: regressed to 54 with new onset sciatica symptoms.   3/5/2020         PLAN  []  Upgrade activities as tolerated     [x]  Continue plan of care  []  Update interventions per flow sheet       []  Discharge due to:_  []  Other:_      Naida Ormond, PT, DPT 3/5/2020  7:39 AM    Future Appointments   Date Time Provider Garland Jj   3/6/2020  8:00 AM Adam Summers [facility]

## 2020-03-05 NOTE — PROGRESS NOTES
[facility] at [address] [place] [address] Drive: [phone]   Fax: [phone]  Progress Note  Patient Name: Vania Lawson : 1975   Medical   Diagnosis: Right hip pain [M25.551] Treatment Diagnosis: Right hip pain   Onset Date: 2019     Referral Source: Referred, Self, MD Start of Care [facility]): 2020   Prior Hospitalization: See medical history Provider #: [id number]   Prior Level of Function: active exerciser at gym 2-3x/wk, long walks daily   Comorbidities: Bursitis of hips   Medications: Verified on Patient Summary List      ===========================================================================================  Assessment / Summary of Care:  Vania Lawson is a 45 y.o.  female with right proximal hamstring tendon strain symptoms that are now almost completely resolved. However, patient is now reporting new symptoms which are indicative of right sciatica, which patient has had prior episodes. PT is modifying treatment program to address new symptoms. .    Patient will continue to benefit from skilled PT services to modify and progress therapeutic interventions, address functional mobility deficits, address ROM deficits, address strength deficits, analyze and address soft tissue restrictions, analyze and cue movement patterns, analyze and modify body mechanics/ergonomics, assess and modify postural abnormalities,  and instruct in home and community integration to attain remaining goals.       ===========================================================================================    Plan:Continue therapy per initial plan/protocol at a frequency of  2 x per week for 4 weeks    Short Term Goals: To be accomplished in 4 weeks:                   Patient will report compliance with HEP 1x/day to aid in rehabilitation program.                   Status at IE: Patient instructed in and provided written copy of initial Home Exercise Program.                   Current: Reports performing HEP daily at home, now with correct technique.   3/5/2020                      Patient will increase hip ROM to 100% in all planes to aid in completion of ADLs                   Status at IE:  Hip Left Right   Flexion 142 123   Extension 18 11   Abduction 42 37   Adduction 38 43   ER 47 45   IR 43 35                    Current: right hip flexion improved to 128, extension remains 11 degrees.  2/20/2020        Long Term Goals: To be accomplished in 8 weeks:                   Patient will increase B LE strength to 5/5 MMT throughout to aid in completion of ADLs.                  Status at IE:  Hip Left Right   Flexion 4 4   Extension 4 4-   Abduction 4 4-   Adduction 4 4   ER 4 4-   IR 4 4                    Current: Right hip strength improved to 4/5 throughout. 3/5/2020                      Patient will report pain no greater than 1-2/10 throughout entire day to aid in completion of ADLs                   Status at IE: 3-8/10                   Current: Right hip pain no longer sharp, but now showing symptoms of sciatica  3/5/2020                      Patent will be able to participate in full yoga class session without increased pain.                   Status at IE:Unable to tolerate hip stretches in yoga class.                   Current: Now participating in yoga class, with some modifications. 3/5/2020                      Patient will improve FOTO (an established functional score where 100 = no disability) score to 73 points to demonstrate improvement in functional status.                  Status at IE: 62                   Current:  regressed to 52 with new onset sciatica symptoms. 3/5/2020    ===========================================================================================  Subjective: \"The location of the pain is shifting quite a bit. It is more deep in hip and tends to shoot down the leg.  It is pretty similar to the sciatica I had in the past.\"       Therapist Signature: Odalys Barron, PT, DPT Date: 7/5/6499   Re-Certification:  Time: 3:24 AM       I certify that the above Therapy Services are being furnished while the patient is under my care. I agree with the treatment plan and certify that this therapy is necessary. [] I have read the above and request that my patient continue as recommended. [] I have read the above report and request that my patient continue therapy with the following changes/special instructions: ______________________________________  [] I have read the above report and request that my patient be discharged from therapy    Physician's Signature:____________Date:_________TIME:________    ** Signature, Date and Time must be completed for valid certification **    [facility] at [address]                    Phone: [phone]   Fax: [phone]  .

## 2020-03-12 ENCOUNTER — HOSPITAL ENCOUNTER (OUTPATIENT)
Dept: PHYSICAL THERAPY | Age: 45
Discharge: HOME | End: 2020-03-12
Payer: COMMERCIAL

## 2020-03-12 PROCEDURE — 97110 THERAPEUTIC EXERCISES: CPT

## 2020-03-12 NOTE — PROGRESS NOTES
PT DAILY TREATMENT NOTE - [facility]     Patient Name: César Owusu  Date:3/12/2020  : 1975  [x]  Patient  Verified  Payor: BLUE CROSS / Plan: [address] [place] [address] / Product Type: PPO /    In time:730  Out time:820  Total Treatment Time (min): 50  Total Timed Codes (min): 50   1:1 Treatment Time ( only): 50   Visit #:  16    Treatment Area: Right hip pain [M25.551]    SUBJECTIVE  Pain Level (0-10 scale): 4  Any medication changes, allergies to medications, adverse drug reactions, diagnosis change, or new procedure performed?: [x] No    [] Yes (see summary sheet for update)  Subjective functional status/changes:   [] No changes reported  Patient reports that she has glute soreness that radiates down her right leg. OBJECTIVE    50 min Therapeutic Exercise:  [x] See flow sheet :   Rationale: increase ROM, increase strength and decrease pain to improve the patients ability to complete ADLs          With   [x] TE   [] TA   [] neuro   [] other: Patient Education: [x] Review HEP    [] Progressed/Changed HEP based on:   [] positioning   [] body mechanics   [] transfers   [] heat/ice application    [] other:      Other Objective/Functional Measures: NA     Pain Level (0-10 scale) post treatment: 0    ASSESSMENT/Changes in Function: Patient responds well to treatment session as she had a reduction in radicular symptoms with hamstring isometric exerciuse. Placed nerve glide on hold as it increased patient's symptoms. Focused visit on strengthening the posterior and lateral hips. She responded well to loading of her hip musculature as she reported muscle fatigue but no pain at the end of the session.  No adverse effects were noted from today's treatment session    Patient will continue to benefit from skilled PT services to modify and progress therapeutic interventions, address functional mobility deficits, address ROM deficits, address strength deficits, analyze and address soft tissue restrictions, analyze and cue movement patterns, analyze and modify body mechanics/ergonomics, assess and modify postural abnormalities, and instruct in home and community integration to attain remaining goals. []  See Plan of Care  []  See progress note/recertification  []  See Discharge Summary         Progress towards goals / Updated goals:  Short Term Goals: To be accomplished in 4 weeks:                   IXERNGI will report compliance with HEP 1x/day to aid in rehabilitation program.                   Status at IE: Patient instructed in and provided written copy of initial Home Exercise Program.                   Current: Reports performing HEP daily at home, added hamstring isometric   3/12/2020                      Patient will increase hip ROM to 100% in all planes to aid in completion of ADLs                   Status at IE:  Hip Left Right   Flexion 142 123   Extension 18 11   Abduction 42 37   Adduction 38 43   ER 47 45   IR 43 35                    Current: right hip flexion improved to 128, extension remains 11 degrees.  2/20/2020        Long Term Goals: To be accomplished in 8 weeks:                   Patient will increase B LE strength to 5/5 MMT throughout to aid in completion of ADLs.                    Status at IE:  Hip Left Right   Flexion 4 4   Extension 4 4-   Abduction 4 4-   Adduction 4 4   ER 4 4-   IR 4 4                    Current: Right hip strength improved to 4/5 throughout.     3/5/2020                      Patient will report pain no greater than 1-2/10 throughout entire day to aid in completion of ADLs                   Status at IE: 3-8/10                   Current: Right hip pain no longer sharp, but now showing symptoms of sciatica  3/5/2020                      Patent will be able to participate in full yoga class session without increased pain.                   Status at IE:Unable to tolerate hip stretches in yoga class.                   Current: Now participating in yoga class, with some modifications.    3/5/2020                      Patient will improve FOTO (an established functional score where 100 = no disability) score to 73 points to demonstrate improvement in functional status.                  Status at IE: 62                   Current:  regressed to 52 with new onset sciatica symptoms.   3/5/2020    PLAN  []  Upgrade activities as tolerated     [x]  Continue plan of care  []  Update interventions per flow sheet       []  Discharge due to:_  []  Other:_      Alexander Mariusz PT, DPT 3/12/2020  7:32 AM    No future appointments.

## 2020-03-19 ENCOUNTER — APPOINTMENT (OUTPATIENT)
Dept: PHYSICAL THERAPY | Age: 45
End: 2020-03-19
Payer: COMMERCIAL

## 2020-03-24 ENCOUNTER — APPOINTMENT (OUTPATIENT)
Dept: PHYSICAL THERAPY | Age: 45
End: 2020-03-24
Payer: COMMERCIAL

## 2020-03-26 ENCOUNTER — APPOINTMENT (OUTPATIENT)
Dept: PHYSICAL THERAPY | Age: 45
End: 2020-03-26
Payer: COMMERCIAL

## 2020-03-31 ENCOUNTER — APPOINTMENT (OUTPATIENT)
Dept: PHYSICAL THERAPY | Age: 45
End: 2020-03-31
Payer: COMMERCIAL

## 2020-09-10 NOTE — PROGRESS NOTES
[facility] at [address] [place] [address] Drive: [phone]   Fax: [phone]  Discharge Summary  Patient Name: Kiirt Ram : 1975   Medical   Diagnosis: Right hip pain [M25.551] Treatment Diagnosis: Right hip pain   Onset Date: 2019     Referral Source: Referred, Self, MD Start of Care [facility]): 2020   Prior Hospitalization: See medical history Provider #: [id number]   Prior Level of Function: active exerciser at gym 2-3x/wk, long walks daily   Comorbidities: Bursitis of hips   Medications: Verified on Patient Summary List      ===========================================================================================  Assessment / Summary of Care:  Kirit Ram is a 39 y.o.  yo female who was referred for Physical Therapy services at our location. Pt attended nine Physical Therapy sessions through mid-2020 and was placed on hold due public health concerns for [address] [place] [address]. Plan was for patient to contact clinic to re-start PT once coronavirus lockdown was lifted. Patient has not contacted clinic with request to continue PT, so is now discharged from PT services. Pt was provided an updated HEP prior to being put on hold due to coronavirus. Below is patient progress towards Physical Therapy goals at last attended visit.    ===========================================================================================    Plan: Discharge to independent HEP.     Short Term Goals: To be accomplished in 4 weeks:                   JERICHO will report compliance with HEP 1x/day to aid in rehabilitation program.                   Status at IE: Patient instructed in and provided written copy of initial Home Exercise Program.                   Current: Reports performing HEP daily at home, added hamstring isometric   3/12/2020                      Patient will increase hip ROM to 100% in all planes to aid in completion of ADLs                   Status at IE:  Hip Left Right   Flexion 142 123   Extension 18 11   Abduction 42 37   Adduction 38 43   ER 47 45   IR 43 35                    Current: right hip flexion improved to 128, extension remains 11 degrees.  2/20/2020        Long Term Goals: To be accomplished in 8 weeks:                   Patient will increase B LE strength to 5/5 MMT throughout to aid in completion of ADLs.                  Status at IE:  Hip Left Right   Flexion 4 4   Extension 4 4-   Abduction 4 4-   Adduction 4 4   ER 4 4-   IR 4 4                    Current: Right hip strength improved to 4/5 throughout.     3/5/2020                      Patient will report pain no greater than 1-2/10 throughout entire day to aid in completion of ADLs                   Status at IE: 3-8/10                   Current: Right hip pain no longer sharp, but now showing symptoms of sciatica  3/5/2020                      Patent will be able to participate in full yoga class session without increased pain.                   Status at IE:Unable to tolerate hip stretches in yoga class.                   Current: Now participating in yoga class, with some modifications.    3/5/2020                      Patient will improve FOTO (an established functional score where 100 = no disability) score to 73 points to demonstrate improvement in functional status.                  Status at IE: 62                   Current:  regressed to 52 with new onset sciatica symptoms.    3/5/2020      ===========================================================================================    Therapist Signature: Fco Wiggins PT, DPT Date: 9/10/2020     Time: 1:36 PM

## 2023-11-14 ENCOUNTER — HOSPITAL ENCOUNTER (EMERGENCY)
Facility: HOSPITAL | Age: 48
Discharge: HOME | End: 2023-11-15
Payer: COMMERCIAL

## 2023-11-14 VITALS
TEMPERATURE: 99.1 F | OXYGEN SATURATION: 99 % | HEART RATE: 87 BPM | RESPIRATION RATE: 16 BRPM | BODY MASS INDEX: 22.15 KG/M2 | DIASTOLIC BLOOD PRESSURE: 64 MMHG | SYSTOLIC BLOOD PRESSURE: 99 MMHG | WEIGHT: 125 LBS | HEIGHT: 63 IN

## 2023-11-14 DIAGNOSIS — K50.019 TERMINAL ILEITIS WITH COMPLICATION (HCC): Primary | ICD-10-CM

## 2023-11-14 LAB
ALBUMIN SERPL-MCNC: 3.3 G/DL (ref 3.4–5)
ALBUMIN/GLOB SERPL: 0.8 (ref 0.8–1.7)
ALP SERPL-CCNC: 45 U/L (ref 45–117)
ALT SERPL-CCNC: 17 U/L (ref 13–56)
ANION GAP SERPL CALC-SCNC: 5 MMOL/L (ref 3–18)
APPEARANCE UR: CLEAR
AST SERPL-CCNC: 13 U/L (ref 10–38)
BACTERIA URNS QL MICRO: ABNORMAL /HPF
BASOPHILS # BLD: 0 K/UL (ref 0–0.1)
BASOPHILS NFR BLD: 0 % (ref 0–2)
BILIRUB SERPL-MCNC: 0.4 MG/DL (ref 0.2–1)
BILIRUB UR QL: NEGATIVE
BUN SERPL-MCNC: 14 MG/DL (ref 7–18)
BUN/CREAT SERPL: 18 (ref 12–20)
CALCIUM SERPL-MCNC: 9.1 MG/DL (ref 8.5–10.1)
CHLORIDE SERPL-SCNC: 101 MMOL/L (ref 100–111)
CO2 SERPL-SCNC: 28 MMOL/L (ref 21–32)
COLOR UR: ABNORMAL
CREAT SERPL-MCNC: 0.79 MG/DL (ref 0.6–1.3)
DIFFERENTIAL METHOD BLD: ABNORMAL
EOSINOPHIL # BLD: 0 K/UL (ref 0–0.4)
EOSINOPHIL NFR BLD: 0 % (ref 0–5)
EPITH CASTS URNS QL MICRO: ABNORMAL /LPF (ref 0–5)
ERYTHROCYTE [DISTWIDTH] IN BLOOD BY AUTOMATED COUNT: 11.6 % (ref 11.6–14.5)
GLOBULIN SER CALC-MCNC: 4.2 G/DL (ref 2–4)
GLUCOSE SERPL-MCNC: 91 MG/DL (ref 74–99)
GLUCOSE UR STRIP.AUTO-MCNC: NEGATIVE MG/DL
HCG SERPL QL: NEGATIVE
HCG UR QL: NEGATIVE
HCT VFR BLD AUTO: 34.2 % (ref 35–45)
HGB BLD-MCNC: 11.5 G/DL (ref 12–16)
HGB UR QL STRIP: ABNORMAL
IMM GRANULOCYTES # BLD AUTO: 0 K/UL (ref 0–0.04)
IMM GRANULOCYTES NFR BLD AUTO: 0 % (ref 0–0.5)
KETONES UR QL STRIP.AUTO: NEGATIVE MG/DL
LEUKOCYTE ESTERASE UR QL STRIP.AUTO: ABNORMAL
LIPASE SERPL-CCNC: 38 U/L (ref 13–75)
LYMPHOCYTES # BLD: 0.8 K/UL (ref 0.9–3.6)
LYMPHOCYTES NFR BLD: 13 % (ref 21–52)
MCH RBC QN AUTO: 29.6 PG (ref 24–34)
MCHC RBC AUTO-ENTMCNC: 33.6 G/DL (ref 31–37)
MCV RBC AUTO: 88.1 FL (ref 78–100)
MONOCYTES # BLD: 0.5 K/UL (ref 0.05–1.2)
MONOCYTES NFR BLD: 9 % (ref 3–10)
NEUTS SEG # BLD: 4.6 K/UL (ref 1.8–8)
NEUTS SEG NFR BLD: 78 % (ref 40–73)
NITRITE UR QL STRIP.AUTO: NEGATIVE
NRBC # BLD: 0 K/UL (ref 0–0.01)
NRBC BLD-RTO: 0 PER 100 WBC
PH UR STRIP: 8 (ref 5–8)
PLATELET # BLD AUTO: 236 K/UL (ref 135–420)
PMV BLD AUTO: 9.8 FL (ref 9.2–11.8)
POTASSIUM SERPL-SCNC: 3.8 MMOL/L (ref 3.5–5.5)
PROT SERPL-MCNC: 7.5 G/DL (ref 6.4–8.2)
PROT UR STRIP-MCNC: 30 MG/DL
RBC # BLD AUTO: 3.88 M/UL (ref 4.2–5.3)
RBC #/AREA URNS HPF: ABNORMAL /HPF (ref 0–5)
SODIUM SERPL-SCNC: 134 MMOL/L (ref 136–145)
SP GR UR REFRACTOMETRY: 1 (ref 1–1.03)
UROBILINOGEN UR QL STRIP.AUTO: 0.2 EU/DL (ref 0.2–1)
WBC # BLD AUTO: 6 K/UL (ref 4.6–13.2)
WBC URNS QL MICRO: ABNORMAL /HPF (ref 0–5)

## 2023-11-14 PROCEDURE — 85025 COMPLETE CBC W/AUTO DIFF WBC: CPT

## 2023-11-14 PROCEDURE — 81025 URINE PREGNANCY TEST: CPT

## 2023-11-14 PROCEDURE — 96361 HYDRATE IV INFUSION ADD-ON: CPT

## 2023-11-14 PROCEDURE — 80053 COMPREHEN METABOLIC PANEL: CPT

## 2023-11-14 PROCEDURE — 81001 URINALYSIS AUTO W/SCOPE: CPT

## 2023-11-14 PROCEDURE — 84703 CHORIONIC GONADOTROPIN ASSAY: CPT

## 2023-11-14 PROCEDURE — 96360 HYDRATION IV INFUSION INIT: CPT

## 2023-11-14 PROCEDURE — 83690 ASSAY OF LIPASE: CPT

## 2023-11-14 PROCEDURE — 99285 EMERGENCY DEPT VISIT HI MDM: CPT

## 2023-11-14 ASSESSMENT — PAIN SCALES - GENERAL: PAINLEVEL_OUTOF10: 6

## 2023-11-14 ASSESSMENT — PAIN - FUNCTIONAL ASSESSMENT: PAIN_FUNCTIONAL_ASSESSMENT: 0-10

## 2023-11-15 ENCOUNTER — APPOINTMENT (OUTPATIENT)
Facility: HOSPITAL | Age: 48
End: 2023-11-15
Payer: COMMERCIAL

## 2023-11-15 PROCEDURE — 6360000004 HC RX CONTRAST MEDICATION: Performed by: PHYSICIAN ASSISTANT

## 2023-11-15 PROCEDURE — 74177 CT ABD & PELVIS W/CONTRAST: CPT

## 2023-11-15 PROCEDURE — 2580000003 HC RX 258: Performed by: PHYSICIAN ASSISTANT

## 2023-11-15 RX ORDER — DICYCLOMINE HCL 20 MG
20 TABLET ORAL
Qty: 20 TABLET | Refills: 0 | Status: SHIPPED | OUTPATIENT
Start: 2023-11-15

## 2023-11-15 RX ORDER — 0.9 % SODIUM CHLORIDE 0.9 %
1000 INTRAVENOUS SOLUTION INTRAVENOUS ONCE
Status: COMPLETED | OUTPATIENT
Start: 2023-11-15 | End: 2023-11-15

## 2023-11-15 RX ADMIN — IOPAMIDOL 100 ML: 612 INJECTION, SOLUTION INTRAVENOUS at 01:03

## 2023-11-15 RX ADMIN — SODIUM CHLORIDE 1000 ML: 9 INJECTION, SOLUTION INTRAVENOUS at 00:58

## 2023-11-15 NOTE — ED PROVIDER NOTES
tablet  Commonly known as: ZOLOFT     vitamin D 25 MCG (1000 UT) Caps               Where to Get Your Medications        These medications were sent to [address], [address]  [address] [facility] [place], [address] [address]      Phone: [phone]   dicyclomine 20 MG tablet           I am the Primary Clinician of Record. (Please note that parts of this dictation were completed with voice recognition software. Quite often unanticipated grammatical, syntax, homophones, and other interpretive errors are inadvertently transcribed by the computer software. Please disregards these errors.  Please excuse any errors that have escaped final proofreading.)       Milady Quiles, [place]  11/15/23 0676

## 2025-03-12 ENCOUNTER — APPOINTMENT (OUTPATIENT)
Facility: HOSPITAL | Age: 50
End: 2025-03-12
Payer: COMMERCIAL

## 2025-03-12 ENCOUNTER — HOSPITAL ENCOUNTER (EMERGENCY)
Facility: HOSPITAL | Age: 50
Discharge: HOME | End: 2025-03-12
Attending: EMERGENCY MEDICINE
Payer: COMMERCIAL

## 2025-03-12 VITALS
TEMPERATURE: 98.4 F | RESPIRATION RATE: 18 BRPM | SYSTOLIC BLOOD PRESSURE: 112 MMHG | DIASTOLIC BLOOD PRESSURE: 67 MMHG | HEIGHT: 64 IN | BODY MASS INDEX: 22.2 KG/M2 | WEIGHT: 130 LBS | OXYGEN SATURATION: 99 % | HEART RATE: 94 BPM

## 2025-03-12 DIAGNOSIS — R10.31 ABDOMINAL PAIN, RIGHT LOWER QUADRANT: Primary | ICD-10-CM

## 2025-03-12 LAB
ALBUMIN SERPL-MCNC: 4 G/DL (ref 3.4–5)
ALBUMIN/GLOB SERPL: 1.1 (ref 0.8–1.7)
ALP SERPL-CCNC: 45 U/L (ref 45–117)
ALT SERPL-CCNC: 20 U/L (ref 13–56)
ANION GAP SERPL CALC-SCNC: 6 MMOL/L (ref 3–18)
APPEARANCE UR: CLEAR
AST SERPL-CCNC: 14 U/L (ref 10–38)
BACTERIA URNS QL MICRO: ABNORMAL /HPF
BASOPHILS # BLD: 0.02 K/UL (ref 0–0.1)
BASOPHILS NFR BLD: 0.4 % (ref 0–2)
BILIRUB SERPL-MCNC: 0.5 MG/DL (ref 0.2–1)
BILIRUB UR QL: NEGATIVE
BUN SERPL-MCNC: 12 MG/DL (ref 7–18)
BUN/CREAT SERPL: 16 (ref 12–20)
CALCIUM SERPL-MCNC: 9.3 MG/DL (ref 8.5–10.1)
CHLORIDE SERPL-SCNC: 100 MMOL/L (ref 100–111)
CO2 SERPL-SCNC: 30 MMOL/L (ref 21–32)
COLOR UR: YELLOW
CREAT SERPL-MCNC: 0.76 MG/DL (ref 0.6–1.3)
DIFFERENTIAL METHOD BLD: ABNORMAL
EOSINOPHIL # BLD: 0.04 K/UL (ref 0–0.4)
EOSINOPHIL NFR BLD: 0.7 % (ref 0–5)
EPITH CASTS URNS QL MICRO: ABNORMAL /LPF (ref 0–5)
ERYTHROCYTE [DISTWIDTH] IN BLOOD BY AUTOMATED COUNT: 12.5 % (ref 11.6–14.5)
GLOBULIN SER CALC-MCNC: 3.6 G/DL (ref 2–4)
GLUCOSE SERPL-MCNC: 93 MG/DL (ref 74–99)
GLUCOSE UR STRIP.AUTO-MCNC: NEGATIVE MG/DL
HCG UR QL: NEGATIVE
HCT VFR BLD AUTO: 36.3 % (ref 35–45)
HGB BLD-MCNC: 12.3 G/DL (ref 12–16)
HGB UR QL STRIP: NEGATIVE
IMM GRANULOCYTES # BLD AUTO: 0.02 K/UL (ref 0–0.04)
IMM GRANULOCYTES NFR BLD AUTO: 0.4 % (ref 0–0.5)
KETONES UR QL STRIP.AUTO: NEGATIVE MG/DL
LEUKOCYTE ESTERASE UR QL STRIP.AUTO: ABNORMAL
LYMPHOCYTES # BLD: 1.46 K/UL (ref 0.9–3.3)
LYMPHOCYTES NFR BLD: 26.8 % (ref 21–52)
MCH RBC QN AUTO: 29.8 PG (ref 24–34)
MCHC RBC AUTO-ENTMCNC: 33.9 G/DL (ref 31–37)
MCV RBC AUTO: 87.9 FL (ref 78–100)
MONOCYTES # BLD: 0.37 K/UL (ref 0.05–1.2)
MONOCYTES NFR BLD: 6.8 % (ref 3–10)
NEUTS SEG # BLD: 3.54 K/UL (ref 1.8–8)
NEUTS SEG NFR BLD: 64.9 % (ref 40–73)
NITRITE UR QL STRIP.AUTO: NEGATIVE
NRBC # BLD: 0 K/UL (ref 0–0.01)
NRBC BLD-RTO: 0 PER 100 WBC
PH UR STRIP: 7 (ref 5–8)
PLATELET # BLD AUTO: 316 K/UL (ref 135–420)
PMV BLD AUTO: 9.9 FL (ref 9.2–11.8)
POTASSIUM SERPL-SCNC: 3.7 MMOL/L (ref 3.5–5.5)
PROT SERPL-MCNC: 7.6 G/DL (ref 6.4–8.2)
PROT UR STRIP-MCNC: NEGATIVE MG/DL
RBC # BLD AUTO: 4.13 M/UL (ref 4.2–5.3)
RBC #/AREA URNS HPF: ABNORMAL /HPF (ref 0–5)
SODIUM SERPL-SCNC: 136 MMOL/L (ref 136–145)
SP GR UR REFRACTOMETRY: 1 (ref 1–1.03)
UROBILINOGEN UR QL STRIP.AUTO: 0.2 EU/DL (ref 0.2–1)
WBC # BLD AUTO: 5.5 K/UL (ref 4.6–13.2)
WBC URNS QL MICRO: ABNORMAL /HPF (ref 0–5)

## 2025-03-12 PROCEDURE — 74177 CT ABD & PELVIS W/CONTRAST: CPT

## 2025-03-12 PROCEDURE — 85025 COMPLETE CBC W/AUTO DIFF WBC: CPT

## 2025-03-12 PROCEDURE — 6360000004 HC RX CONTRAST MEDICATION: Performed by: EMERGENCY MEDICINE

## 2025-03-12 PROCEDURE — 81025 URINE PREGNANCY TEST: CPT

## 2025-03-12 PROCEDURE — 80053 COMPREHEN METABOLIC PANEL: CPT

## 2025-03-12 PROCEDURE — 99285 EMERGENCY DEPT VISIT HI MDM: CPT

## 2025-03-12 PROCEDURE — 81001 URINALYSIS AUTO W/SCOPE: CPT

## 2025-03-12 RX ORDER — IOPAMIDOL 612 MG/ML
100 INJECTION, SOLUTION INTRAVASCULAR
Status: COMPLETED | OUTPATIENT
Start: 2025-03-12 | End: 2025-03-12

## 2025-03-12 RX ADMIN — IOPAMIDOL 100 ML: 612 INJECTION, SOLUTION INTRAVENOUS at 21:03

## 2025-03-12 ASSESSMENT — PAIN - FUNCTIONAL ASSESSMENT: PAIN_FUNCTIONAL_ASSESSMENT: 0-10

## 2025-03-12 ASSESSMENT — PAIN SCALES - GENERAL: PAINLEVEL_OUTOF10: 5

## 2025-03-12 NOTE — ED TRIAGE NOTES
Pt to ed with complaints of RLQ abdominal pain x3 days. Pt states she saw her PCP yesterday and is scheduled for a CT scan of her abdomen tomorrow but does not want to wait.

## 2025-03-13 NOTE — ED PROVIDER NOTES
EMERGENCY DEPARTMENT HISTORY AND PHYSICAL EXAM    8:02 PM EDT seen in room 3 at this time        Date: 3/12/2025  Patient Name: Milagros Sotomayor    History of Presenting Illness     Chief Complaint   Patient presents with    Abdominal Pain         History Provided By: patient    Additional History (Context): Milagros Sotomayor is a 49 y.o. female presents with no contributory medical history has 10 days to 2 weeks of right lower quadrant pain intermittent low-grade at this time became severe last night after bowel movement.  Ongoing chronic diarrhea is at its baseline no vomiting no fever.  Last menstrual period was last week.  Low likelihood of pregnancy, monogamous and partner has vasectomy.  No abdominal surgical history.  No fever.  Seen by primary doctor today and labs done and scheduled for CT tomorrow but was anxious so came to have this done tonight.  Low-grade pain at the time my exam.    PCP: Abisai May MD    Chief Complaint:   Duration:    Timing:    Location:   Quality:   Severity:   Modifying Factors:   Associated Symptoms:       No current facility-administered medications for this encounter.     Current Outpatient Medications   Medication Sig Dispense Refill    dicyclomine (BENTYL) 20 MG tablet Take 1 tablet by mouth every 6-8 hours as needed (abdominal pain/cramping) 20 tablet 0    busPIRone (BUSPAR) 5 MG tablet Take by mouth      vitamin D 25 MCG (1000 UT) CAPS Take by mouth daily      esomeprazole (NEXIUM) 40 MG delayed release capsule Take 40 mg by mouth daily      fluticasone (FLONASE) 50 MCG/ACT nasal spray 2 sprays by Nasal route as needed      Lutein 20 MG CAPS Take by mouth daily      norethindrone-ethinyl estradiol (JUNEL FE 1/20) 1-20 MG-MCG per tablet Take by mouth      norethindrone-ethinyl estradiol (FEMHRT LOW DOSE) 0.5-2.5 MG-MCG per tablet Take 1 tablet by mouth daily      ondansetron (ZOFRAN-ODT) 4 MG disintegrating tablet Take 4 mg by mouth every 8 hours as needed      Probiotic